# Patient Record
Sex: MALE | URBAN - METROPOLITAN AREA
[De-identification: names, ages, dates, MRNs, and addresses within clinical notes are randomized per-mention and may not be internally consistent; named-entity substitution may affect disease eponyms.]

---

## 2022-02-21 ENCOUNTER — NURSE TRIAGE (OUTPATIENT)
Dept: ADMINISTRATIVE | Facility: CLINIC | Age: 42
End: 2022-02-21

## 2022-02-22 NOTE — TELEPHONE ENCOUNTER
"Pt calling from [place], c/o abdominal pain that goes into chest or under left breast, LUQ & throat pain  Started in December, states stopped drinking & helped the pain but started drinking every now & then, denies fever, rates pain 5/10, took Pepto today but hasn't taken any for couple of weeks. Answered yes to black tarry stools, states stools have been like that off & on before taking Pepto. Advised pt to go to ER NOW, verbalized understanding.     Reason for Disposition   Black or tarry bowel movements (Exception: chronic-unchanged black-grey bowel movements AND is taking iron pills or Pepto-bismol)    Additional Information   Negative: Shock suspected (e.g., cold/pale/clammy skin, too weak to stand, low BP, rapid pulse)   Negative: Difficult to awaken or acting confused (e.g., disoriented, slurred speech)   Negative: Passed out (i.e., lost consciousness, collapsed and was not responding)   Negative: Sounds like a life-threatening emergency to the triager   Negative: [1] SEVERE pain (e.g., excruciating) AND [2] present > 1 hour   Negative: [1] SEVERE pain AND [2] age > 60 years   Negative: [1] Vomiting AND [2] contains red blood or black ("coffee ground") material  (Exception: few red streaks in vomit that only happened once)   Negative: Blood in bowel movements  (Exception: Blood on surface of BM with constipation)    Protocols used: ABDOMINAL PAIN - MALE-A-AH      "